# Patient Record
Sex: FEMALE | Race: AMERICAN INDIAN OR ALASKA NATIVE
[De-identification: names, ages, dates, MRNs, and addresses within clinical notes are randomized per-mention and may not be internally consistent; named-entity substitution may affect disease eponyms.]

---

## 2018-10-06 ENCOUNTER — HOSPITAL ENCOUNTER (EMERGENCY)
Dept: HOSPITAL 41 - JD.ED | Age: 23
Discharge: HOME | End: 2018-10-06
Payer: COMMERCIAL

## 2018-10-06 VITALS — SYSTOLIC BLOOD PRESSURE: 95 MMHG | DIASTOLIC BLOOD PRESSURE: 60 MMHG

## 2018-10-06 DIAGNOSIS — I95.2: Primary | ICD-10-CM

## 2018-10-06 DIAGNOSIS — E66.9: ICD-10-CM

## 2018-10-06 NOTE — EDM.PDOC
ED HPI GENERAL MEDICAL PROBLEM





- General


Chief Complaint: Cardiovascular Problem


Stated Complaint: LOW BLOOD PRESSURE


Time Seen by Provider: 10/06/18 08:12


Source of Information: Reports: Patient, RN Notes Reviewed, Other (Correctional 

officer)


History Limitations: Reports: No Limitations





- History of Present Illness


INITIAL COMMENTS - FREE TEXT/NARRATIVE: 





The patient is an inmate at the [facility]. She states that 

she felt lightheaded while standing in line for breakfast this morning. She 

states that she had only been standing for about 2 or 3 minutes, and when she 

sat down, she did not feel any better. She was taken to the nurse's station, 

where she states she vomited. No prior similar symptoms.





The patient states that she was started on prazosin last night, to help treat 

her nightmares. The patient does not have a history of hypertension.





Here in the ED, the patient's initial BP is found to be 95/60 with a HR of 65.





The patient does not of the PCP outside of the [facility].











- Related Data


 Allergies











Allergy/AdvReac Type Severity Reaction Status Date / Time


 


No Known Allergies Allergy   Verified 10/06/18 07:52











Home Meds: 


 Home Meds





Escitalopram [Lexapro] 10 mg PO BEDTIME 10/06/18 [History]


Omeprazole 20 mg PO DAILY 10/06/18 [History]


Prazosin HCl [Prazosin] 1 mg PO BEDTIME 10/06/18 [History]


QUEtiapine Fumarate [Seroquel] 50 mg PO BEDTIME 10/06/18 [History]











Past Medical History


Gastrointestinal History: Reports: GERD


Psychiatric History: Reports: Anxiety, Depression, Other (See Below) (Insomnia)


Endocrine/Metabolic History: Reports: Obesity/BMI 30+





- Past Surgical History


GI Surgical History: Reports: Cholecystectomy





Social & Family History





- Tobacco Use


Smoking Status *Q: Never Smoker





- Caffeine Use


Caffeine Use: Reports: None





- Alcohol Use


Alcohol Use History: Yes


Alcohol Use Frequency: Socially





- Recreational Drug Use


Recreational Drug Use: Yes


Drug Use in Last 12 Months: Yes


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 

2017)





- Living Situation & Occupation


Living situation: Reports: Single, Other ([facility])





ED ROS GENERAL





- Review of Systems


Review Of Systems: ROS reveals no pertinent complaints other than HPI.





ED EXAM, GENERAL





- Physical Exam


Exam: See Below


Exam Limited By: No Limitations


General Appearance: Alert, WD/WN, No Apparent Distress


Eye Exam: Bilateral Eye: EOMI, Normal Inspection


Ears: Normal External Exam, Hearing Grossly Normal


Nose: Normal Inspection


Throat/Mouth: Normal Inspection, Normal Lips, Normal Voice, No Airway Compromise


Head: Atraumatic, Normocephalic


Neck: Normal Inspection, Full Range of Motion


Respiratory/Chest: No Respiratory Distress, Lungs Clear, Normal Breath Sounds, 

No Accessory Muscle Use


Cardiovascular: Normal Peripheral Pulses, Regular Rate, Rhythm, No Edema, No 

Gallop, No JVD, No Murmur, No Rub


Peripheral Pulses: 4+: Radial (L), Radial (R)


GI/Abdominal: Normal Bowel Sounds, Soft, Non-Tender, No Organomegaly, No 

Distention, No Abnormal Bruit, No Mass


 (Female) Exam: Deferred


Rectal (Female) Exam: Deferred


Back Exam: Normal Inspection, Full Range of Motion, NT


Extremities: Normal Inspection, Normal Range of Motion, No Pedal Edema, Normal 

Capillary Refill


Neurological: Alert, Oriented, Normal Cognition, No Motor/Sensory Deficits


Psychiatric: Normal Affect


Skin Exam: Warm, Dry, Intact, Normal Color, No Rash





Course





- Vital Signs


Last Recorded V/S: 


 Last Vital Signs











Temp  36.9 C   10/06/18 07:57


 


Pulse  65   10/06/18 07:57


 


Resp  20   10/06/18 07:57


 


BP  95/60   10/06/18 07:57


 


Pulse Ox  98   10/06/18 07:57








 





Orthostatic Blood Pressure [     99/67


Standing]                        


Orthostatic Blood Pressure [     106/68


Sitting]                         


Orthostatic Blood Pressure [     93/55


Supine]                          











- Orders/Labs/Meds


Orders: 


 Active Orders 24 hr











 Category Date Time Status


 


 Orthostatic Vital Signs [RC] STAT Care  10/06/18 08:14 Active














- Re-Assessments/Exams


Free Text/Narrative Re-Assessment/Exam: 





10/06/18 08:35


The patient was started on prazosin 1 mg last night, to help treat nightmares, 

and while this is a recognized indication for this medicine, it is also a 

powerful alpha-1 blocker and antihypertensive. I believe it would be better to 

get this medication if the patient has a history of hypertension, which this 

patient does not. I'm therefore recommending that the patient discontinue it. 

The duration of this medication is 10 to 24 hours, therefore she will likely be 

lightheaded upon standing for most of the day, but should feel better by 

tomorrow.





The patient is not orthostatic. This is consistent with her hypotension being 

due to the prazosin, as opposed to intravascular depletion. I do not see an 

indication for any other workup at this time, given her history and entirely 

negative physical examination. If her symptoms persist, or if new symptoms 

develop, I would like her to return to the ED.





Departure





- Departure


Time of Disposition: 08:37


Disposition: Home, Self-Care 01


Condition: Good


Clinical Impression: 


 Hypotension due to medication





Instructions:  Hypotension, Easy-to-Read


Referrals: 


Sowmya Roberts PA-C [Primary Care Provider] - 


Forms:  ED Department Discharge


Additional Instructions: 


You were seen in the emergency room for lightheadedness, after being started on 

prazosin last night.





In the ER, your blood pressure was found to be low at 95/60.





Workup in the ER included positional blood pressure checks, which were normal. 

You are not intravascularly depleted.





Based on your history and physical examination, the cause of your low blood 

pressure is the prazosin, and while this medication can be used to help treat 

nightmares, it is best used on someone who also has hypertension.





We recommend that you DISCONTINUE taking prazosin.





Prazosin will continue to lower your blood pressure for 10 to 24 hours from the 

time you took it, so you should expect to remain lightheaded when standing 

until later today or even tonight, however, you should feel better by tomorrow.





If your low blood pressure persists, or if new symptoms develop, please return 

to the ER for reevaluation.





- My Orders


Last 24 Hours: 


My Active Orders





10/06/18 08:14


Orthostatic Vital Signs [RC] STAT 














- Assessment/Plan


Last 24 Hours: 


My Active Orders





10/06/18 08:14


Orthostatic Vital Signs [RC] STAT

## 2019-01-24 ENCOUNTER — HOSPITAL ENCOUNTER (EMERGENCY)
Dept: HOSPITAL 41 - JD.ED | Age: 24
LOS: 1 days | Discharge: HOME | End: 2019-01-25
Payer: COMMERCIAL

## 2019-01-24 VITALS — DIASTOLIC BLOOD PRESSURE: 51 MMHG | SYSTOLIC BLOOD PRESSURE: 103 MMHG

## 2019-01-24 DIAGNOSIS — T39.1X1A: Primary | ICD-10-CM

## 2019-01-24 DIAGNOSIS — F41.9: ICD-10-CM

## 2019-01-24 DIAGNOSIS — R11.2: ICD-10-CM

## 2019-01-24 DIAGNOSIS — F32.9: ICD-10-CM

## 2019-01-24 DIAGNOSIS — Z79.899: ICD-10-CM

## 2019-01-24 DIAGNOSIS — K21.9: ICD-10-CM

## 2019-01-24 PROCEDURE — G0480 DRUG TEST DEF 1-7 CLASSES: HCPCS

## 2019-01-24 PROCEDURE — 99284 EMERGENCY DEPT VISIT MOD MDM: CPT

## 2019-01-24 PROCEDURE — 80053 COMPREHEN METABOLIC PANEL: CPT

## 2019-01-24 PROCEDURE — 80306 DRUG TEST PRSMV INSTRMNT: CPT

## 2019-01-24 PROCEDURE — 96361 HYDRATE IV INFUSION ADD-ON: CPT

## 2019-01-24 PROCEDURE — 85025 COMPLETE CBC W/AUTO DIFF WBC: CPT

## 2019-01-24 PROCEDURE — 96374 THER/PROPH/DIAG INJ IV PUSH: CPT

## 2019-01-24 PROCEDURE — 36415 COLL VENOUS BLD VENIPUNCTURE: CPT

## 2019-01-25 NOTE — EDM.PDOCBH
ED HPI GENERAL MEDICAL PROBLEM





- General


Chief Complaint: Drug or Alcohol Abuse


Stated Complaint: TAKEN PILLS SENT FROM [place]


Time Seen by Provider: 01/24/19 22:29


Source of Information: Reports: Patient


History Limitations: Reports: No Limitations





- History of Present Illness


INITIAL COMMENTS - FREE TEXT/NARRATIVE: 





The patient presents from the [facility] skilled [facility] in [place] for acetaminophen 

overdose.  She says she took the meds at 1930 this evening.  She denies being 

suicidal.  She says she took them because she was hurting.  She has some nausea 

and upper abdominal pain.  She has no fever, chills, cough, congestion, runny 

nose, chest pain or shortness of breath.  She has no depression.  She has hurt 

herself in the past but she says again that she was not trying to kill herself 

tonight.  She also took 2 other capsules.  She is not sure what these were.


Onset: Sudden


Duration: Hour(s): (1930 tonight)


Location: Reports: Abdomen


Quality: Reports: Ache


Severity: Mild


Improves with: Reports: None


Worsens with: Reports: None


Associated Symptoms: Reports: Nausea/Vomiting.  Denies: Chest Pain, Fever/Chills

, Headaches, Shortness of Breath


  ** Abdominal


Pain Score (Numeric/FACES): 8





- Related Data


 Allergies











Allergy/AdvReac Type Severity Reaction Status Date / Time


 


No Known Allergies Allergy   Verified 01/24/19 22:26











Home Meds: 


 Home Meds





Escitalopram [Lexapro] 20 mg PO DAILY 01/24/19 [History]


Mirtazapine 15 mg PO DAILY 01/24/19 [History]


Triamcinolone Acetonide 1 applicful TOP DAILY 01/24/19 [History]











Past Medical History





- Past Health History


Medical/Surgical History: Denies Medical/Surgical History


Gastrointestinal History: Reports: GERD


Psychiatric History: Reports: Anxiety, Depression, Other (See Below)


Endocrine/Metabolic History: Reports: Obesity/BMI 30+





- Past Surgical History


GI Surgical History: Reports: Cholecystectomy





Social & Family History





- Tobacco Use


Smoking Status *Q: Never Smoker


Second Hand Smoke Exposure: No





- Caffeine Use


Caffeine Use: Reports: Coffee, Soda





- Recreational Drug Use


Recreational Drug Use: Yes


Drug Use in Last 12 Months: No





- Living Situation & Occupation


Living situation: Reports: Single, Other ([facility])





ED ROS GENERAL





- Review of Systems


Review Of Systems: See Below


Constitutional: Reports: No Symptoms


HEENT: Reports: No Symptoms


Respiratory: Reports: No Symptoms


Cardiovascular: Reports: No Symptoms


Endocrine: Reports: No Symptoms


GI/Abdominal: Reports: Abdominal Pain, Nausea.  Denies: Diarrhea, Vomiting


: Reports: No Symptoms





ED EXAM, BEHAVIORAL HEALTH





- Physical Exam


Exam: See Below


Exam Limited By: No Limitations


General Appearance: Alert, No Apparent Distress


Ears: Normal External Exam


Nose: Normal Inspection


Head: Atraumatic, Normocephalic


Neck: Normal Inspection


Respiratory/Chest: No Respiratory Distress, Lungs Clear, Normal Breath Sounds


Cardiovascular: Regular Rate, Rhythm, No Edema, No Murmur


GI/Abdominal: Soft, Non-Tender, No Organomegaly, No Mass


Back Exam: Normal Inspection


Extremities: Normal Inspection


Neurological: Alert, No Motor/Sensory Deficits, Oriented x 3





COURSE, BEHAVIORAL HEALTH COMP





- Course


Vital Signs: 





 Last Vital Signs











Temp  98.2 F   01/24/19 22:19


 


Pulse  50 L  01/24/19 22:19


 


Resp  16   01/24/19 22:19


 


BP  103/51 L  01/24/19 22:19


 


Pulse Ox  98   01/24/19 22:19











Orders, Labs, Meds: 





 Active Orders 24 hr











 Category Date Time Status


 


 Cardiac Monitoring [RC] .AS DIRECTED Care  01/24/19 22:52 Active


 


 Peripheral IV Care [RC] .AS DIRECTED Care  01/24/19 22:53 Active


 


 Sodium Chloride 0.9% [Normal Saline] 1,000 ml Med  01/24/19 23:00 Active





 IV .BOLUS   


 


 Sodium Chloride 0.9% [Saline Flush] Med  01/24/19 22:52 Active





 10 ml FLUSH ASDIRECTED PRN   


 


 ED Antiemetic Medication Reflex [OM.PC] Stat Oth  01/24/19 22:52 Ordered


 


 Peripheral IV Insertion Adult [OM.PC] Stat Oth  01/24/19 22:52 Ordered








 Medication Orders





Sodium Chloride (Normal Saline)  1,000 mls @ 1,000 mls/hr IV .BOLUS CEE


   Last Admin: 01/24/19 23:29  Dose: 1,000 mls/hr


Sodium Chloride (Saline Flush)  10 ml FLUSH ASDIRECTED PRN


   PRN Reason: Keep Vein Open


   Last Admin: 01/24/19 23:29  Dose: 10 ml





 Laboratory Tests











  01/24/19 01/24/19 01/24/19 Range/Units





  22:57 23:30 23:30 


 


WBC   7.72   (3.98-10.04)  K/mm3


 


RBC   4.65   (3.98-5.22)  M/mm3


 


Hgb   12.8   (11.2-15.7)  gm/L


 


Hct   39.1   (34.1-44.9)  %


 


MCV   84.1   (79.4-94.8)  fl


 


MCH   27.5   (25.6-32.2)  pg


 


MCHC   32.7   (32.2-35.5)  g/dl


 


RDW Std Deviation   40.9   (36.4-46.3)  fL


 


Plt Count   254   (182-369)  K/mm3


 


MPV   9.8   (9.4-12.3)  fl


 


Neut % (Auto)   63.8   (34.0-71.1)  %


 


Lymph % (Auto)   26.4   (19.3-51.7)  %


 


Mono % (Auto)   7.9   (4.7-12.5)  %


 


Eos % (Auto)   1.7   (0.7-5.8)  


 


Baso % (Auto)   0.1   (0.1-1.2)  %


 


Neut # (Auto)   4.92   (1.56-6.13)  K/mm3


 


Lymph # (Auto)   2.04   (1.18-3.74)  K/mm3


 


Mono # (Auto)   0.61 H   (0.24-0.36)  K/mm3


 


Eos # (Auto)   0.13   (0.04-0.36)  K/mm3


 


Baso # (Auto)   0.01   (0.01-0.08)  K/mm3


 


Sodium    141  (136-145)  mEq/L


 


Potassium    3.9  (3.5-5.1)  mEq/L


 


Chloride    106  ()  mEq/L


 


Carbon Dioxide    25  (21-32)  mEq/L


 


Anion Gap    13.9  (5-15)  


 


BUN    13  (7-18)  mg/dL


 


Creatinine    0.8  (0.55-1.02)  mg/dL


 


Est Cr Clr Drug Dosing    97.57  mL/min


 


Estimated GFR (MDRD)    > 60  (>60)  mL/min


 


BUN/Creatinine Ratio    16.3  (14-18)  


 


Glucose    94  ()  mg/dL


 


Calcium    9.1  (8.5-10.1)  mg/dL


 


Total Bilirubin    0.9  (0.2-1.0)  mg/dL


 


AST    24  (15-37)  U/L


 


ALT    38  (14-59)  U/L


 


Alkaline Phosphatase    84  ()  U/L


 


Total Protein    7.4  (6.4-8.2)  g/dl


 


Albumin    3.9  (3.4-5.0)  g/dl


 


Globulin    3.5  gm/dL


 


Albumin/Globulin Ratio    1.1  (1-2)  


 


Salicylates     (2.8-20)  mg/dL


 


Urine Opiates Screen  Negative    (SGSMYC=145)  


 


Ur Buprenorphine Scrn  Negative    (CUTOFF=10)  


 


Ur Oxycodone Screen  Negative    (EPN5TI=081)  


 


Urine Methadone Screen  Negative    (JHJKYE=598)  


 


Ur Propoxyphene Screen  Negative    (CZFPEO=361)  


 


Acetaminophen     (10-30)  ug/mL


 


Ur Barbiturates Screen  Negative    (VPILJN=056)  


 


Ur Tricyclics Screen  Negative    (BLDOHH=836)  


 


Ur Phencyclidine Scrn  Negative    (CUTOFF=25)  


 


Ur Amphetamine Screen  Negative    (VYEPRZ=226)  


 


U Methamphetamines Scrn  Negative    (KMTYFP=127)  


 


U Benzodiazepines Scrn  Negative    (ZNKDTC=437)  


 


U Cocaine Metab Screen  Negative    (PUBHKP=289)  


 


U Marijuana (THC) Screen  Negative    (CUTOFF=50)  


 


Ethyl Alcohol    0.00  (0.00)  gm%














  01/24/19 01/24/19 Range/Units





  23:30 23:30 


 


WBC    (3.98-10.04)  K/mm3


 


RBC    (3.98-5.22)  M/mm3


 


Hgb    (11.2-15.7)  gm/L


 


Hct    (34.1-44.9)  %


 


MCV    (79.4-94.8)  fl


 


MCH    (25.6-32.2)  pg


 


MCHC    (32.2-35.5)  g/dl


 


RDW Std Deviation    (36.4-46.3)  fL


 


Plt Count    (182-369)  K/mm3


 


MPV    (9.4-12.3)  fl


 


Neut % (Auto)    (34.0-71.1)  %


 


Lymph % (Auto)    (19.3-51.7)  %


 


Mono % (Auto)    (4.7-12.5)  %


 


Eos % (Auto)    (0.7-5.8)  


 


Baso % (Auto)    (0.1-1.2)  %


 


Neut # (Auto)    (1.56-6.13)  K/mm3


 


Lymph # (Auto)    (1.18-3.74)  K/mm3


 


Mono # (Auto)    (0.24-0.36)  K/mm3


 


Eos # (Auto)    (0.04-0.36)  K/mm3


 


Baso # (Auto)    (0.01-0.08)  K/mm3


 


Sodium    (136-145)  mEq/L


 


Potassium    (3.5-5.1)  mEq/L


 


Chloride    ()  mEq/L


 


Carbon Dioxide    (21-32)  mEq/L


 


Anion Gap    (5-15)  


 


BUN    (7-18)  mg/dL


 


Creatinine    (0.55-1.02)  mg/dL


 


Est Cr Clr Drug Dosing    mL/min


 


Estimated GFR (MDRD)    (>60)  mL/min


 


BUN/Creatinine Ratio    (14-18)  


 


Glucose    ()  mg/dL


 


Calcium    (8.5-10.1)  mg/dL


 


Total Bilirubin    (0.2-1.0)  mg/dL


 


AST    (15-37)  U/L


 


ALT    (14-59)  U/L


 


Alkaline Phosphatase    ()  U/L


 


Total Protein    (6.4-8.2)  g/dl


 


Albumin    (3.4-5.0)  g/dl


 


Globulin    gm/dL


 


Albumin/Globulin Ratio    (1-2)  


 


Salicylates  0.3 L   (2.8-20)  mg/dL


 


Urine Opiates Screen    (BMDAMX=646)  


 


Ur Buprenorphine Scrn    (CUTOFF=10)  


 


Ur Oxycodone Screen    (IVL7EL=443)  


 


Urine Methadone Screen    (KJAQJE=703)  


 


Ur Propoxyphene Screen    (PXIANC=191)  


 


Acetaminophen   0 L  (10-30)  ug/mL


 


Ur Barbiturates Screen    (VOQYED=429)  


 


Ur Tricyclics Screen    (ERFZRX=234)  


 


Ur Phencyclidine Scrn    (CUTOFF=25)  


 


Ur Amphetamine Screen    (BXAVDL=444)  


 


U Methamphetamines Scrn    (KDAYPA=791)  


 


U Benzodiazepines Scrn    (BBSQWA=572)  


 


U Cocaine Metab Screen    (BBCZGR=595)  


 


U Marijuana (THC) Screen    (CUTOFF=50)  


 


Ethyl Alcohol    (0.00)  gm%








Medications











Generic Name Dose Route Start Last Admin





  Trade Name Freq  PRN Reason Stop Dose Admin


 


Sodium Chloride  1,000 mls @ 1,000 mls/hr  01/24/19 23:00  01/24/19 23:29





  Normal Saline  IV   1,000 mls/hr





  .BOLUS CEE   Administration





     





     





     





     


 


Sodium Chloride  10 ml  01/24/19 22:52  01/24/19 23:29





  Saline Flush  FLUSH   10 ml





  ASDIRECTED PRN   Administration





  Keep Vein Open   





     





     





     














Discontinued Medications














Generic Name Dose Route Start Last Admin





  Trade Name Darylq  PRN Reason Stop Dose Admin


 


Ondansetron HCl  4 mg  01/24/19 22:52  01/24/19 23:29





  Zofran  IVPUSH  01/24/19 22:53  4 mg





  ONETIME ONE   Administration





     





     





     





     











Re-Assessment/Re-Exam: 





I ordered an IV NS 1L bolus, zofran 4mg IV, and labs.  Her CBC and CMP look 

good.  She had normal liver enzymes.  Her acetaminophen was zero.  She is sure 

of the time she took the acetaminophen.  At this time there is none in her 

system.  Her salicylates were low.  Her UDS was negative and ETOH was negative.

  She feels better.  I will discharge her home.





Departure





- Departure


Time of Disposition: 01:20


Disposition: Home, Self-Care 01


Condition: Good


Clinical Impression: 


Overdose by acetaminophen


Qualifiers:


 Encounter type: initial encounter Injury intent: accidental or unintentional 

Qualified Code(s): T39.1X1A - Poisoning by 4-Aminophenol derivatives, 

accidental (unintentional), initial encounter








- Discharge Information


*PRESCRIPTION DRUG MONITORING PROGRAM REVIEWED*: No


*COPY OF PRESCRIPTION DRUG MONITORING REPORT IN PATIENT MATT: No


Referrals: 


PCP,None [Primary Care Provider] - 


Additional Instructions: 


Take your medication as prescribed.  Please return if you are worse.





- My Orders


Last 24 Hours: 





My Active Orders





01/24/19 22:52


Cardiac Monitoring [RC] .AS DIRECTED 


Sodium Chloride 0.9% [Saline Flush]   10 ml FLUSH ASDIRECTED PRN 


ED Antiemetic Medication Reflex [OM.PC] Stat 


Peripheral IV Insertion Adult [OM.PC] Stat 





01/24/19 22:53


Peripheral IV Care [RC] .AS DIRECTED 





01/24/19 23:00


Sodium Chloride 0.9% [Normal Saline] 1,000 ml IV .BOLUS 














- Assessment/Plan


Last 24 Hours: 





My Active Orders





01/24/19 22:52


Cardiac Monitoring [RC] .AS DIRECTED 


Sodium Chloride 0.9% [Saline Flush]   10 ml FLUSH ASDIRECTED PRN 


ED Antiemetic Medication Reflex [OM.PC] Stat 


Peripheral IV Insertion Adult [OM.PC] Stat 





01/24/19 22:53


Peripheral IV Care [RC] .AS DIRECTED 





01/24/19 23:00


Sodium Chloride 0.9% [Normal Saline] 1,000 ml IV .BOLUS